# Patient Record
Sex: FEMALE | Race: ASIAN | NOT HISPANIC OR LATINO | URBAN - METROPOLITAN AREA
[De-identification: names, ages, dates, MRNs, and addresses within clinical notes are randomized per-mention and may not be internally consistent; named-entity substitution may affect disease eponyms.]

---

## 2017-01-04 ENCOUNTER — OFFICE VISIT - HEALTHEAST (OUTPATIENT)
Dept: FAMILY MEDICINE | Facility: CLINIC | Age: 37
End: 2017-01-04

## 2017-01-04 DIAGNOSIS — Z02.89 REFUGEE HEALTH EXAMINATION: ICD-10-CM

## 2017-01-04 DIAGNOSIS — Z23 NEED FOR PROPHYLACTIC VACCINATION AND INOCULATION AGAINST INFLUENZA: ICD-10-CM

## 2017-01-04 DIAGNOSIS — Z23 IMMUNIZATION DUE: ICD-10-CM

## 2017-01-04 DIAGNOSIS — M25.522 PAIN OF BOTH ELBOWS: ICD-10-CM

## 2017-01-04 DIAGNOSIS — B65.9 SCHISTOSOMIASIS: ICD-10-CM

## 2017-01-04 DIAGNOSIS — M25.521 PAIN OF BOTH ELBOWS: ICD-10-CM

## 2017-01-04 RX ORDER — PRAZIQUANTEL 600 MG/1
TABLET, FILM COATED ORAL
Qty: 5 TABLET | Refills: 0 | Status: SHIPPED | OUTPATIENT
Start: 2017-01-04

## 2017-01-04 ASSESSMENT — MIFFLIN-ST. JEOR: SCORE: 1134.76

## 2017-02-01 ENCOUNTER — OFFICE VISIT - HEALTHEAST (OUTPATIENT)
Dept: FAMILY MEDICINE | Facility: CLINIC | Age: 37
End: 2017-02-01

## 2017-02-01 ENCOUNTER — COMMUNICATION - HEALTHEAST (OUTPATIENT)
Dept: FAMILY MEDICINE | Facility: CLINIC | Age: 37
End: 2017-02-01

## 2017-02-01 DIAGNOSIS — M25.561 KNEE PAIN, BILATERAL: ICD-10-CM

## 2017-02-01 DIAGNOSIS — B65.9 SCHISTOSOMIASIS: ICD-10-CM

## 2017-02-01 DIAGNOSIS — Z12.4 ROUTINE CERVICAL SMEAR: ICD-10-CM

## 2017-02-01 DIAGNOSIS — M25.521 BILATERAL ELBOW JOINT PAIN: ICD-10-CM

## 2017-02-01 DIAGNOSIS — M25.522 BILATERAL ELBOW JOINT PAIN: ICD-10-CM

## 2017-02-01 DIAGNOSIS — M25.562 KNEE PAIN, BILATERAL: ICD-10-CM

## 2017-02-01 ASSESSMENT — MIFFLIN-ST. JEOR: SCORE: 1138.54

## 2017-02-06 LAB
HPV INTERPRETATION - HISTORICAL: NORMAL
HPV INTERPRETER - HISTORICAL: NORMAL

## 2017-02-09 ENCOUNTER — COMMUNICATION - HEALTHEAST (OUTPATIENT)
Dept: FAMILY MEDICINE | Facility: CLINIC | Age: 37
End: 2017-02-09

## 2017-03-02 ENCOUNTER — OFFICE VISIT - HEALTHEAST (OUTPATIENT)
Dept: FAMILY MEDICINE | Facility: CLINIC | Age: 37
End: 2017-03-02

## 2017-03-02 DIAGNOSIS — M77.12 LATERAL EPICONDYLITIS OF BOTH ELBOWS: ICD-10-CM

## 2017-03-02 DIAGNOSIS — M77.11 LATERAL EPICONDYLITIS OF BOTH ELBOWS: ICD-10-CM

## 2017-03-02 ASSESSMENT — MIFFLIN-ST. JEOR: SCORE: 1157.02

## 2018-01-07 ENCOUNTER — HEALTH MAINTENANCE LETTER (OUTPATIENT)
Age: 38
End: 2018-01-07

## 2018-02-28 ENCOUNTER — OFFICE VISIT - HEALTHEAST (OUTPATIENT)
Dept: FAMILY MEDICINE | Facility: CLINIC | Age: 38
End: 2018-02-28

## 2018-02-28 DIAGNOSIS — Z28.39 IMMUNIZATION DEFICIENCY: ICD-10-CM

## 2018-02-28 ASSESSMENT — MIFFLIN-ST. JEOR: SCORE: 1165.66

## 2021-05-30 VITALS — WEIGHT: 113.19 LBS | BODY MASS INDEX: 21.37 KG/M2 | HEIGHT: 61 IN

## 2021-05-30 VITALS — WEIGHT: 117.5 LBS | BODY MASS INDEX: 22.19 KG/M2 | HEIGHT: 61 IN

## 2021-05-30 VITALS — HEIGHT: 61 IN | WEIGHT: 113.44 LBS | BODY MASS INDEX: 21.42 KG/M2

## 2021-06-01 VITALS — BODY MASS INDEX: 21.76 KG/M2 | HEIGHT: 62 IN | WEIGHT: 118.25 LBS

## 2021-06-08 NOTE — PROGRESS NOTES
ASSESMENT AND PLAN:    Refugee Screening - Reviewed overseas paperwork.  Confirmed pre-departure anti-parasite treatment.  Reviewed refugee screening labs.  Immunization review and update done, see flowsheet.  Refugee mental health screen done, see that document.  Reviewed healthy lifestyle issues and resettlement issues.  Encouraged dental follow-up.  Discussed age appropriate cancer screening but no invasive exam or testing done on initial visit with new arrival refugee patients.    Diagnoses and all orders for this visit:    Refugee health examination  Negative MTB Quantiferon.    Schistosomiasis  -     praziquantel (BILTRICIDE) 600 mg tablet; 2 tabs in am, one tab at noon and 2 tabs at night for one day  Dispense: 5 tablet; Refill: 0    Pain of both elbows  -     ibuprofen (ADVIL,MOTRIN) 600 MG tablet; Take 1 tablet (600 mg total) by mouth every 6 (six) hours as needed for pain.  Dispense: 60 tablet; Refill: 2  RTC in 4 weeks for recheck and PAP.    Need for prophylactic vaccination and inoculation against influenza  -     Influenza, Seasonal,Quad Inj, 36+ MOS    Immunization due  -     Hepatitis B vaccine age 11 years and above IM  -     Varicella vaccine subq        HPI: Arrived to [place] from refugee [place] 2 months ago.  Lives with 18-year-old son and her mother.  She is .  Not sexually active, hasn't been for more than 10 years.  Complaining of bilateral elbow pain, worse on the left for the past 2 months.  No swelling.  No other joint pain.  No injuries to the elbow.  She is right handed.    ROS:Since arrival:  Fever: No  Abdo pain: No  Diarrhea: No  Sob: No  Cough: No   sx: No      Mental Health screening:  Sleeping well: No  Thinking too much: No  Bad dreams: No  Mood ok: Yes  School/work/daily life: No    Family Med Hx: No known chronic medical conditions.    Habits:  Tobacco Use:No  Etoh Use:Yes  Drug Use:No  Betel nuts:No  No past medical history on file.    Social History     Social  "History     Marital status:      Spouse name: N/A     Number of children: N/A     Years of education: N/A     Social History Main Topics     Smoking status: Never Smoker     Smokeless tobacco: Never Used     Alcohol use 0.6 oz/week     1 Glasses of wine per week     Drug use: No     Sexual activity: Not Asked     Other Topics Concern     None     Social History Narrative     None           OBJECTICE:   Visit Vitals     /70     Pulse 88     Temp 97.5  F (36.4  C) (Oral)     Resp 16     Ht 5' 1.25\" (1.556 m)     Wt 113 lb 3 oz (51.3 kg)     LMP 11/24/2016 (Approximate)     SpO2 97%     Breastfeeding No     BMI 21.21 kg/m2        Recent Results (from the past 1344 hour(s))   Schistosoma Species Antibody, IgG    Collection Time: 12/02/16 10:14 AM   Result Value Ref Range    Schistosoma Ab, IgG, S Positive (!)    Strongyloides Antibody, IgG,S    Collection Time: 12/02/16 10:14 AM   Result Value Ref Range    Strongyloides Ab, IgG, S Negative Negative   Varicella Zoster Immune Status Antibody, IgG    Collection Time: 12/02/16 10:14 AM   Result Value Ref Range    Varicella Zoster Immune Status Non Immune (!) Immune   Comprehensive Metabolic Panel    Collection Time: 12/02/16 10:14 AM   Result Value Ref Range    Sodium 141 136 - 145 mmol/L    Potassium 3.7 3.5 - 5.0 mmol/L    Chloride 104 98 - 107 mmol/L    CO2 26 22 - 31 mmol/L    Anion Gap, Calculation 11 5 - 18 mmol/L    Glucose 77 70 - 125 mg/dL    BUN 7 (L) 8 - 22 mg/dL    Creatinine 0.69 0.60 - 1.10 mg/dL    GFR MDRD Af Amer >60 >60 mL/min/1.73m2    GFR MDRD Non Af Amer >60 >60 mL/min/1.73m2    Bilirubin, Total 0.7 0.0 - 1.0 mg/dL    Calcium 9.2 8.5 - 10.5 mg/dL    Protein, Total 7.0 6.0 - 8.0 g/dL    Albumin 3.9 3.5 - 5.0 g/dL    Alkaline Phosphatase 63 45 - 120 U/L    AST 15 0 - 40 U/L    ALT 9 0 - 45 U/L   HIV Antigen/Antibody Screening Cascade    Collection Time: 12/02/16 10:14 AM   Result Value Ref Range    HIV Antigen / Antibody Negative Negative "   LDL Cholesterol, Direct    Collection Time: 12/02/16 10:14 AM   Result Value Ref Range    Direct  <=129 mg/dl   Hepatitis C Antibody (Anti-HCV)    Collection Time: 12/02/16 10:14 AM   Result Value Ref Range    Hepatitis C Ab Negative Negative   Hepatitis A Immune Status    Collection Time: 12/02/16 10:14 AM   Result Value Ref Range    Hepatitis A IgG (Immune Status) Positive Positive   Hepatitis B Surface Antibody (Anti-HBs)    Collection Time: 12/02/16 10:14 AM   Result Value Ref Range    Hep B Surface Antibody Positive (!) Negative   Hepatitis B Surface Antigen (HBsAG)    Collection Time: 12/02/16 10:14 AM   Result Value Ref Range    Hepatitis B Surface Ag Negative Negative   Hepatitis B Core Antibody (Anti-HBc)    Collection Time: 12/02/16 10:14 AM   Result Value Ref Range    Hep B Core Total Ab Positive (!) Negative   Mycobacterium tuberculosis by QuantiFERON-TB Gold    Collection Time: 12/02/16 10:14 AM   Result Value Ref Range    MTB Quantiferon Result Negative Negative    MTB Antigen Value 0.02 IU/mL    Scan Result See Scanned Report    HM1 (CBC with Diff)    Collection Time: 12/02/16 10:14 AM   Result Value Ref Range    WBC 6.3 4.0 - 11.0 thou/uL    RBC 4.92 3.80 - 5.40 mill/uL    Hemoglobin 15.8 12.0 - 16.0 g/dL    Hematocrit 45.5 35.0 - 47.0 %    MCV 93 80 - 100 fL    MCH 32.1 27.0 - 34.0 pg    MCHC 34.7 32.0 - 36.0 g/dL    RDW 12.9 11.0 - 14.5 %    Platelets 199 140 - 440 thou/uL    MPV 12.9 (H) 8.5 - 12.5 fL    Neutrophils % 64 50 - 70 %    Lymphocytes % 27 20 - 40 %    Monocytes % 7 2 - 10 %    Eosinophils % 2 0 - 6 %    Basophils % 1 0 - 2 %    Neutrophils Absolute 4.0 2.0 - 7.7 thou/uL    Lymphocytes Absolute 1.7 0.8 - 4.4 thou/uL    Monocytes Absolute 0.4 0.0 - 0.9 thou/uL    Eosinophils Absolute 0.1 0.0 - 0.4 thou/uL    Basophils Absolute 0.0 0.0 - 0.2 thou/uL       Vitals listed above within normal limits  General appearance: well groomed, pleasant, well hydrated, nontoxic appearing  ENT:  PERRL, throat clear  Neck: neck supple, no lymphadenopathy, no thyromegaly  Lungs: lungs clear to auscultation bilaterally, no wheezes or rhonchi  Heart: regular rate and rhythm, no murmurs, rubs or gallops  Abdomen: soft, nontender  Neuro: no focal deficits, CN II-XII grossly intact, alert and oriented  Psych:  mood stable, appears to have good insight and judgment       This transcription uses voice recognition software, which may contain typographical errors.

## 2021-06-08 NOTE — PROGRESS NOTES
"ASSESMENT AND PLAN:  Diagnoses and all orders for this visit:    Routine cervical smear  -     Gynecologic Cytology (PAP Smear)    Bilateral elbow joint pain  -     ibuprofen (ADVIL,MOTRIN) 400 MG tablet; Take 1 tablet (400 mg total) by mouth 3 times a day after meals for 10 days.  Dispense: 90 tablet; Refill: 1  Bilateral epicondylitis.  Will try ibuprofen first.  ?  Counter force brace.  Will consider physical therapy referral.  She will follow-up in 4 weeks.    Schistosomiasis  Praziquantel was prescribed.  Did not receive it at the pharmacy, was told there were no prescription.  Called pharmacy.  Verified the prescription is ready for pickup.  Advised patient to  prescription today and take as directed.    Knee pain, bilateral        SUBJECTIVE: David Billy is here for pap and pelvic.  Never had pap before.  Not sexually active currently.  , ex- is in [facility].  Has teenage son.  Not on any birth control methods.  Periods are regular.  No vaginal discharge or irritation.  No known history of STDs.  Complaining of bilateral elbow pain for 4 months.  Constant pain worse with heavy lifting and doing pushups.  No injuries to the elbow.  Notice some swelling initially.  Also has history of bilateral knee pain on and off for the past few months.  No swelling on the knees.  No injuries to the knees.  Hasn't tried any over-the-counter medications.    No past medical history on file.  Patient Active Problem List   Diagnosis     Schistosomiasis     Bilateral elbow joint pain       Allergies:  No Known Allergies    History   Smoking Status     Never Smoker   Smokeless Tobacco     Never Used       Review of systems otherwise negative except as listed in HPI.   History   Smoking Status     Never Smoker   Smokeless Tobacco     Never Used       OBJECTICE:   Visit Vitals     /74     Pulse 90     Temp 98.2  F (36.8  C) (Oral)     Resp 16     Ht 5' 1.42\" (1.56 m)     Wt 113 lb 7 oz (51.5 kg)     LMP " 01/25/2017 (Exact Date)     SpO2 99%     Breastfeeding No     BMI 21.14 kg/m2         GEN-alert,  in no apparent distress.  - normal external genitalia.  Vaginal wall appears normal.  Cervix shows some erosion, no active bleeding.  EXTREM-  ELBOWS- Tenderness on lateral epicondylitis, bilaterally.  Pain with passive wrist flexion bilaterally                    KNEES- No tenderness or swelling. No effusion. Full ROM.   SKIN-normal        [facility]   2/2/2017

## 2021-06-09 NOTE — PROGRESS NOTES
"ASSESMENT AND PLAN:  Diagnoses and all orders for this visit:    Lateral epicondylitis of both elbows  -     NC UPPER LIMB ORTHOSIS NOS  Allergic reaction with ibuprofen.  Discussed options including  Brace, steroid injection.  Patient will try counter force brace. F/U in 4 weeks.       SUBJECTIVE: David Mariajose is here to follow-up on elbow pain.  She was given NSAID, ibuprofen.  She took one.  And developed rashes all over her body and stopped taking the medication.  States the pain is still the same.  She has this pain for about 5 months now. It is constant pain worse with heavy lifting and doing pushups. No injuries to the elbow. Notice some swelling initially now improved.  No past medical history on file.  Patient Active Problem List   Diagnosis     Schistosomiasis     Bilateral elbow joint pain     Lateral epicondylitis     Knee pain, bilateral       Allergies:    Allergies   Allergen Reactions     Ibuprofen Rash       History   Smoking Status     Never Smoker   Smokeless Tobacco     Never Used       Review of systems otherwise negative except as listed in HPI.   History   Smoking Status     Never Smoker   Smokeless Tobacco     Never Used       OBJECTICE:   Visit Vitals     /72     Pulse 84     Temp 97.6  F (36.4  C) (Oral)     Resp 20     Ht 5' 1.42\" (1.56 m)     Wt 117 lb 8 oz (53.3 kg)     LMP 01/25/2017 (Exact Date)     SpO2 (!) 9%     BMI 21.9 kg/m2         GEN-alert,  in no apparent distress.  HEENT-mucous membranes are moist, neck is supple.  EXT- significant tenderness on the epicondyles bilaterally.  Pain with wrist flexion.        Norbert Saini   3/2/2017   This transcription uses voice recognition software, which may contain typographical errors.      "

## 2021-06-15 PROBLEM — M25.562 KNEE PAIN, BILATERAL: Status: ACTIVE | Noted: 2017-02-02

## 2021-06-15 PROBLEM — M25.561 KNEE PAIN, BILATERAL: Status: ACTIVE | Noted: 2017-02-02

## 2021-06-15 PROBLEM — B65.9 SCHISTOSOMIASIS: Status: ACTIVE | Noted: 2017-02-02

## 2021-06-15 PROBLEM — M77.10 LATERAL EPICONDYLITIS: Status: ACTIVE | Noted: 2017-02-02

## 2021-06-15 PROBLEM — M25.522 BILATERAL ELBOW JOINT PAIN: Status: ACTIVE | Noted: 2017-02-02

## 2021-06-15 PROBLEM — M25.521 BILATERAL ELBOW JOINT PAIN: Status: ACTIVE | Noted: 2017-02-02

## 2021-06-16 NOTE — PROGRESS NOTES
"S:  Patient seen in clinic today for green card exam and update of immunizations.  There is no past history of immunization reactions.  No recent fevers or shortness of breath.     Patient Active Problem List   Diagnosis     Schistosomiasis     Bilateral elbow joint pain     Lateral epicondylitis     Knee pain, bilateral       O:  /80 (Patient Site: Right Arm, Patient Position: Sitting, Cuff Size: Adult Regular)  Pulse 88  Temp 98.7  F (37.1  C) (Oral)   Resp 20  Ht 5' 1.75\" (1.568 m)  Wt 118 lb 4 oz (53.6 kg)  LMP 02/25/2018  Breastfeeding? No  BMI 21.8 kg/m2  General - alert, NAD  CV - RRR  Resp - lunds clear to auscultation    A/P:  Green Card/update imm.  Counseling done on immunization history and requirements with the patient.  Reviewed available immunization history and relevant lab records with patient and family.  Immunizations given as documented in the EHR and on form.  Acetaminophen as needed for post-immunization fever or myalgias.  [place] Citizens[employer] Immigration [employer] form I-[id number] completed today with the patient.  Given to patient in a sealed labeled envelope and a copy is being scanned into the EHR.  Please see that form for further details.  Patient directed to follow-up in clinic for routine and preventative care.     "